# Patient Record
Sex: MALE | Race: WHITE | NOT HISPANIC OR LATINO
[De-identification: names, ages, dates, MRNs, and addresses within clinical notes are randomized per-mention and may not be internally consistent; named-entity substitution may affect disease eponyms.]

---

## 2022-09-12 PROBLEM — Z00.00 ENCOUNTER FOR PREVENTIVE HEALTH EXAMINATION: Status: ACTIVE | Noted: 2022-09-12

## 2022-09-29 ENCOUNTER — NON-APPOINTMENT (OUTPATIENT)
Age: 42
End: 2022-09-29

## 2022-09-29 ENCOUNTER — APPOINTMENT (OUTPATIENT)
Dept: UROLOGY | Facility: CLINIC | Age: 42
End: 2022-09-29

## 2022-09-29 VITALS
BODY MASS INDEX: 30.06 KG/M2 | HEIGHT: 70 IN | HEART RATE: 67 BPM | WEIGHT: 210 LBS | DIASTOLIC BLOOD PRESSURE: 79 MMHG | TEMPERATURE: 97.9 F | SYSTOLIC BLOOD PRESSURE: 121 MMHG

## 2022-09-29 DIAGNOSIS — E29.1 TESTICULAR HYPOFUNCTION: ICD-10-CM

## 2022-09-29 PROCEDURE — 93975 VASCULAR STUDY: CPT

## 2022-09-29 PROCEDURE — 99204 OFFICE O/P NEW MOD 45 MIN: CPT | Mod: 25

## 2022-09-29 NOTE — LETTER BODY
[Dear  ___] : Dear  [unfilled], [FreeTextEntry2] : Malcolm Santacruz MD, PhD\par ATTN: LAB/Dario Kim\par [facility]\par [address], 4th Floor\[place] [address] [FreeTextEntry1] : I had the pleasure of seeing TIMOTHY MIJARES, a 42 year old man,  to your patient Dotty Carranza, in the office in consultation today. Please see the attached note for full details.\par \par Thank you very much for allowing me to participate in the care of this patient. If you have any questions please feel free to call me at any time. \par \par \par Sincerely yours,\par \par \par \par Hollnad Perkins MD, SAMUEL\par Director, Male Fertility and Microsurgery\par  of Urology\par [facility]\par

## 2022-09-29 NOTE — PHYSICAL EXAM
[Urethral Meatus] : meatus normal [Penis Abnormality] : normal uncircumcised penis [Urinary Bladder Findings] : the bladder was normal on palpation [Scrotum] : the scrotum was normal [Epididymis] : the epididymides were normal [Testes Tenderness] : no tenderness of the testes [Testes Mass (___cm)] : there were no testicular masses [FreeTextEntry1] : grade II left varicocele. 10cc bilateral testes

## 2022-09-29 NOTE — HISTORY OF PRESENT ILLNESS
[FreeTextEntry1] : 42M  to Dotty Carranza (40F) no children together\par attempting x 12 months\par previous natural preganncy x 2 (2 years ago)\par oriiginally told to have moderate abnormality\par 6 cycles IVF\par 4 cycles with fert\par PGD abnl\par no embryo transfers\par most recent SA now with azoospermia\par wife with history endometriosis\par seen previously by Ledy\par was told to take clomid previously and did not take\par scroal ultrasound  no varicocele\par

## 2022-09-29 NOTE — ASSESSMENT
[FreeTextEntry1] : hypogoandism\par left varicocele\par proven sonographically today\par multiple failed IVFs\par last SA azopsemria\par TT FSH LH E2\par consider clomid\par consider TESE vs varicocelectomy\par f/u 1-2 weeks

## 2022-10-03 LAB
ESTRADIOL SERPL-MCNC: 22 PG/ML
FSH SERPL-MCNC: 23.5 IU/L
LH SERPL-ACNC: 7.3 IU/L
TESTOST FREE SERPL-MCNC: 8.2 PG/ML
TESTOST SERPL-MCNC: 312 NG/DL

## 2022-10-06 ENCOUNTER — APPOINTMENT (OUTPATIENT)
Dept: UROLOGY | Facility: CLINIC | Age: 42
End: 2022-10-06

## 2022-10-06 VITALS — DIASTOLIC BLOOD PRESSURE: 86 MMHG | TEMPERATURE: 98.3 F | SYSTOLIC BLOOD PRESSURE: 128 MMHG | HEART RATE: 74 BPM

## 2022-10-06 DIAGNOSIS — I86.1 SCROTAL VARICES: ICD-10-CM

## 2022-10-06 PROCEDURE — 99214 OFFICE O/P EST MOD 30 MIN: CPT | Mod: 57

## 2022-10-06 NOTE — ASSESSMENT
[FreeTextEntry1] : left varicocele\par azoospermia\par reviewed role office TESE vs microTESE vs varicocelecotmy\par We spoke at length about the role of office based TESE with possible percutaneous epididymal sperm aspiration. Risks- bleeding, pain, infection, hypogonadism all discussed.  Risk of secondary epididymal obstruction also discussed. Procedure will be done under local anesthesia. He understands that sperm yields tend to be lower with this and that he may require future procedure for repeat sperm extraction if IVF attempts fail.\par \par I had a long conversation with him and his wife regarding the role of surgical sperm retrieval. They understand that some patients harbor pockets of spermatogenesis within the testis that can be retrieved surgically and used with IVF. In general, there is an approximately 45-60% likelihood of successful retrieval. Retrieval options, including a complete microdissection of the testis and more limited testicular biopsies were also discussed. They understand that the microTESE may have the best likelihood of identifying spermatogenesis. Risks of surgery, including hematoma, pain, long term hypogonadism requiring testosterone replacement therapy, were also discussed.\par \par We had an in depth conversation regarding the benefit of varicocelectomy today. He understands the literature which overwhelming reports a benefit in improvement in semen parameters. He also understands that there is limited data in terms of spontaneous pregnancy and take home baby rates.There is good data suggesting improved semen parameters which can possibly obviate the need for future IVF and has been shown to improve IVF outcomes in selected patients. We discussed the recent results of a Rachael review which reported that one natural pregnancy is achieved for every 3-5 varicocele repairs over an unknown time frame. We also spoke about the fact that there is a 3 to 6 month delay before improvement is seen in semen parameters. The role of advancing maternal age was discussed in depth.\par \par Surgical risks, including a risk of infection, injury to the testicular artery (extremely rare), injury to the vas deferens and hydrocele were discussed, as was post operative pain and pain control. Post operative skin numbness in the anterior thigh/lateral scrotum were also discussed.\par \par opts for varicocelecotmy\par

## 2022-10-18 ENCOUNTER — TRANSCRIPTION ENCOUNTER (OUTPATIENT)
Age: 42
End: 2022-10-18

## 2022-10-18 NOTE — ASU PATIENT PROFILE, ADULT - NS PREOP UNDERSTANDS INFO
spoke to patient , NPO after 12MN,, no food no smoking no drinking, bring ID photo, insurance and vaccination cards, escort  arranged  , address and telephone given to patient/yes

## 2022-10-18 NOTE — ASU PATIENT PROFILE, ADULT - NS PREOP UNDERSTANDS INFO2
nothing to eat or drink after midnite.  Escort avaiilable to take home after.  Photo id and insurance card to first floor admitting office./yes

## 2022-10-18 NOTE — ASU PATIENT PROFILE, ADULT - NSICDXPASTSURGICALHX_GEN_ALL_CORE_FT
PAST SURGICAL HISTORY:  No significant past surgical history PAST SURGICAL HISTORY:  Status post left foot surgery dayron     PAST SURGICAL HISTORY:  Status post left foot surgery achilles

## 2022-10-18 NOTE — ASU PATIENT PROFILE, ADULT - FALL HARM RISK - UNIVERSAL INTERVENTIONS
Bed in lowest position, wheels locked, appropriate side rails in place/Call bell, personal items and telephone in reach/Instruct patient to call for assistance before getting out of bed or chair/Non-slip footwear when patient is out of bed/[place] to call system/Physically safe environment - no spills, clutter or unnecessary equipment/Purposeful Proactive Rounding/Room/bathroom lighting operational, light cord in reach

## 2022-10-19 ENCOUNTER — OUTPATIENT (OUTPATIENT)
Dept: OUTPATIENT SERVICES | Facility: HOSPITAL | Age: 42
LOS: 1 days | Discharge: HOME | End: 2022-10-19

## 2022-10-19 ENCOUNTER — APPOINTMENT (OUTPATIENT)
Dept: UROLOGY | Facility: AMBULATORY SURGERY CENTER | Age: 42
End: 2022-10-19

## 2022-10-19 ENCOUNTER — TRANSCRIPTION ENCOUNTER (OUTPATIENT)
Age: 42
End: 2022-10-19

## 2022-10-19 VITALS
HEART RATE: 72 BPM | DIASTOLIC BLOOD PRESSURE: 63 MMHG | OXYGEN SATURATION: 98 % | RESPIRATION RATE: 15 BRPM | SYSTOLIC BLOOD PRESSURE: 112 MMHG

## 2022-10-19 VITALS
TEMPERATURE: 98 F | HEIGHT: 69 IN | WEIGHT: 217.82 LBS | OXYGEN SATURATION: 97 % | DIASTOLIC BLOOD PRESSURE: 82 MMHG | HEART RATE: 64 BPM | SYSTOLIC BLOOD PRESSURE: 108 MMHG | RESPIRATION RATE: 16 BRPM

## 2022-10-19 DIAGNOSIS — Z98.890 OTHER SPECIFIED POSTPROCEDURAL STATES: Chronic | ICD-10-CM

## 2022-10-19 PROCEDURE — 55535 REVISE SPERMATIC CORD VEINS: CPT | Mod: LT

## 2022-10-19 DEVICE — CLIP APPLIER COVIDIEN SURGICLIP III 9" SM: Type: IMPLANTABLE DEVICE | Status: FUNCTIONAL

## 2022-10-19 DEVICE — CLIP APPLIER COVIDIEN SURGICLIP 13" LARGE: Type: IMPLANTABLE DEVICE | Status: FUNCTIONAL

## 2022-10-19 RX ORDER — KETOROLAC TROMETHAMINE 30 MG/ML
15 SYRINGE (ML) INJECTION ONCE
Refills: 0 | Status: DISCONTINUED | OUTPATIENT
Start: 2022-10-19 | End: 2022-10-19

## 2022-10-19 RX ORDER — FENTANYL CITRATE 50 UG/ML
25 INJECTION INTRAVENOUS
Refills: 0 | Status: DISCONTINUED | OUTPATIENT
Start: 2022-10-19 | End: 2022-10-19

## 2022-10-19 RX ORDER — ACETAMINOPHEN 500 MG
1000 TABLET ORAL ONCE
Refills: 0 | Status: COMPLETED | OUTPATIENT
Start: 2022-10-19 | End: 2022-10-19

## 2022-10-19 RX ORDER — SODIUM CHLORIDE 9 MG/ML
1000 INJECTION, SOLUTION INTRAVENOUS
Refills: 0 | Status: DISCONTINUED | OUTPATIENT
Start: 2022-10-19 | End: 2022-10-19

## 2022-10-19 RX ORDER — ONDANSETRON 8 MG/1
4 TABLET, FILM COATED ORAL ONCE
Refills: 0 | Status: DISCONTINUED | OUTPATIENT
Start: 2022-10-19 | End: 2022-10-19

## 2022-10-19 RX ORDER — DOCUSATE SODIUM 100 MG
1 CAPSULE ORAL
Qty: 42 | Refills: 0
Start: 2022-10-19 | End: 2022-11-01

## 2022-10-19 RX ADMIN — Medication 1000 MILLIGRAM(S): at 09:58

## 2022-10-19 NOTE — BRIEF OPERATIVE NOTE - OPERATION/FINDINGS
Inguinal incision on left side made. Using microscope, identified 2 testicular arteries. Preserved 2 lymphatics. Varicocele veins clipped. Closed with sutures and dermabond. Local given. Scrotal support

## 2022-10-19 NOTE — ASU DISCHARGE PLAN (ADULT/PEDIATRIC) - CARE PROVIDER_API CALL
Brisa Perkins  Urology  [address], [facility] [place] [address]  Phone: [phone]  Fax: [phone]  Follow Up Time: 2 weeks

## 2022-10-19 NOTE — BRIEF OPERATIVE NOTE - NSICDXBRIEFPROCEDURE_GEN_ALL_CORE_FT
PROCEDURES:  Unilateral microsurgical removal of varicocele 19-Oct-2022 12:33:56 left Alex Santacruz

## 2022-10-19 NOTE — ASU DISCHARGE PLAN (ADULT/PEDIATRIC) - NS MD DC FALL RISK RISK
For information on Fall & Injury Prevention, visit: [email or web address] OR  [email or web address] OR  https://www.cdc.gov/steadi/patient.html

## 2022-10-19 NOTE — ASU DISCHARGE PLAN (ADULT/PEDIATRIC) - ASU DC SPECIAL INSTRUCTIONSFT
VARICOCELECTOMY    SURGICAL WOUND: There are often lumps and bumps that can be felt in the scrotum on either or both sides up to two (2) months or more post operatively. These are of no concern and with time they will soften and disappear.  Any “black and blue” bruising areas are expected and will also resolve over weeks.  Normally, there is also swelling of the scrotum post operatively. Sometimes the tissue fluid which causes the swelling migrates to the penile skin and can look alarming; with time, all the swelling will eventually subside but may take weeks.  A scrotal support and scrotal fluffs should be worn at all times for the next few weeks, unless bathing, to minimize this swelling. You may apply an ice-pack for 15 minutes out of every hour for the first 24 -36 hours to minimize pain and swelling.    STITCHES: The stitches in the incision will dissolve and fall out by themselves. Sometimes skin stitches may open, allowing a slight gaping of the incision. This is no problem if you keep the area clean.  There may be a bluish colored waterproof glue over the incision as well – the glue will peel away and fall off on its own over a couple of weeks.     PAIN: You may have some intermittent pain for up to six (6) weeks post operatively. Pain does not signify any problem unless associated with fever, chills, or inability to void.  If you experience any fevers or chills please call immediately as this may be signs of an infection. You may take Tylenol (acetaminophen) 650-975mg and/or Motrin (ibuprofen) 400-600mg, both available over the counter, for pain every 6 hours as needed. Do not exceed 4000mg of Tylenol (acetaminophen) daily. You may alternate these medications such that you take one or the other every 3 hours for around the clock pain coverage. For severe pain, take Percocet 5/325mg every 8 hours. Perocet does have tylenol, do not exceed 4,000 mg a day of tylenol.     STOOL SOFTENERS: Do not allow yourself to become constipated as straining may cause bleeding. Take stool softeners or a laxative (ex. Miralax, Colace, Senokot, ExLax, etc), available over the counter, if taking Percocet.     ANTICOAGULATION: If you are taking any blood thinning medications, please discuss with your urologist prior to restarting these medications unless otherwise specified.    BATHING: You may sponge bath 24 hours after surgery, but minimize water to the surgical incision    DIET: You may resume your regular diet and regular medication regimen.    ACTIVITY: No heavy lifting or strenuous exercise until you are evaluated at your post-operative appointment. Otherwise, you may return to your usual level of physical activity.    FOLLOW-UP: If you did not already schedule your post-operative appointment, please call your urologist to schedule and follow-up appointment.    CALL YOUR UROLOGIST IF: You have any bleeding that does not stop, inability to void >8 hours, fever over 100.4 F, chills, persistent nausea/vomiting, changes in your incision concerning for infection, or if your pain is not controlled on your discharge pain medications.

## 2022-10-21 PROBLEM — Z78.9 OTHER SPECIFIED HEALTH STATUS: Chronic | Status: ACTIVE | Noted: 2022-10-18

## 2022-11-02 NOTE — HISTORY OF PRESENT ILLNESS
[FreeTextEntry1] : comes in with his wife\par failed previous IVF x mulitple cycles\par 3.5 mm L varicocele\par azoospermia\par \par FSH 23.5\par  History/Exam/Medical Decision Making

## 2022-11-03 ENCOUNTER — APPOINTMENT (OUTPATIENT)
Dept: UROLOGY | Facility: CLINIC | Age: 42
End: 2022-11-03

## 2022-11-03 VITALS
SYSTOLIC BLOOD PRESSURE: 124 MMHG | WEIGHT: 210 LBS | HEIGHT: 70 IN | DIASTOLIC BLOOD PRESSURE: 84 MMHG | BODY MASS INDEX: 30.06 KG/M2 | HEART RATE: 69 BPM | TEMPERATURE: 97.2 F

## 2022-11-03 PROCEDURE — 99024 POSTOP FOLLOW-UP VISIT: CPT

## 2022-11-03 NOTE — HISTORY OF PRESENT ILLNESS
[FreeTextEntry1] : 2 weeks s/p left varicoclee\par feels well\par pain resolved\par no ertyhema\par inciosn clearn\par did ET last week\par f/u 3 months

## 2022-11-11 ENCOUNTER — NON-APPOINTMENT (OUTPATIENT)
Age: 42
End: 2022-11-11

## 2023-01-19 ENCOUNTER — APPOINTMENT (OUTPATIENT)
Dept: UROLOGY | Facility: CLINIC | Age: 43
End: 2023-01-19

## 2023-03-30 ENCOUNTER — APPOINTMENT (OUTPATIENT)
Dept: UROLOGY | Facility: CLINIC | Age: 43
End: 2023-03-30
Payer: COMMERCIAL

## 2023-03-30 VITALS
BODY MASS INDEX: 30.06 KG/M2 | WEIGHT: 210 LBS | TEMPERATURE: 97.7 F | DIASTOLIC BLOOD PRESSURE: 80 MMHG | HEIGHT: 70 IN | HEART RATE: 79 BPM | SYSTOLIC BLOOD PRESSURE: 128 MMHG

## 2023-03-30 PROCEDURE — 99213 OFFICE O/P EST LOW 20 MIN: CPT

## 2023-03-30 NOTE — HISTORY OF PRESENT ILLNESS
[FreeTextEntry1] : 42M s/p varicocelectomy \par \par - feels well, without pain, mo sexual dysfunciton. \par - denies complaints. \par - Reports Wife is pregnant, due July

## 2023-03-30 NOTE — PHYSICAL EXAM
[Normal Appearance] : normal appearance [General Appearance - In No Acute Distress] : no acute distress [Abdomen Hernia] : no hernia was discovered [Costovertebral Angle Tenderness] : no ~M costovertebral angle tenderness [Urinary Bladder Findings] : the bladder was normal on palpation [Testes Tenderness] : no tenderness of the testes [Respiration, Rhythm And Depth] : normal respiratory rhythm and effort [Exaggerated Use Of Accessory Muscles For Inspiration] : no accessory muscle use [Oriented To Time, Place, And Person] : oriented to person, place, and time [Normal Station and Gait] : the gait and station were normal for the patient's age [FreeTextEntry1] : Incision site healed well.

## 2023-03-30 NOTE — ASSESSMENT
[FreeTextEntry1] : 42M s/p varicocelectomy 10/2022\par wife in due in June\par Site healed well.\par non erythematous\par without pain. \par - SA in 6 months

## 2025-07-30 NOTE — ASU PATIENT PROFILE, ADULT - NS PRO ABUSE SCREEN AFRAID ANYONE YN
[facility] Program Outreach:   Name: GUNNAR HORVATH     Question 1   Daily Activities   Have you been able to perform your daily activities such as getting dressed or making your meals, as you normally would? Please press 1 if you have been able to perform daily activities. Press 2 if you have not been able to.   Not performing Daily Activities       Spoke with  POA. Since discharge, patient is feeling better.     Patient is being outreached by the Care Transitions Program with concerns regarding Daily Activities.  Patient is not able to perform all ADL's as they normally would, however, they have someone that is able to help if necessary.    Denies any further questions, concerns or assistance at this time.    no

## (undated) DEVICE — VESSEL LOOP MINI-BLUE 0.075" X 16"

## (undated) DEVICE — SUT SILK 4-0 18" TIES

## (undated) DEVICE — ELCTR BIPOLAR CORD 12FT

## (undated) DEVICE — WARMING BLANKET UPPER ADULT

## (undated) DEVICE — DRSG GAUZE MOISTURIZER 0.5 OZ 4X8

## (undated) DEVICE — SYR LUER LOK 10CC

## (undated) DEVICE — DRSG SUPPORTER ADULT 3" WAISTBAND LRG

## (undated) DEVICE — DOPPLER PROBE 20MHZ DISP

## (undated) DEVICE — GLV 7.5 PROTEXIS (WHITE)

## (undated) DEVICE — DRAIN PENROSE 5/8" X 18" LATEX

## (undated) DEVICE — SUT VICRYL 3-0 27" SH UNDYED

## (undated) DEVICE — DRAPE MICROSCOPE ZEISS

## (undated) DEVICE — SYR LUER LOK 3CC

## (undated) DEVICE — SLV COMPRESSION KNEE MED

## (undated) DEVICE — PACK GENERAL MINOR

## (undated) DEVICE — CATH IV SAFE BC 24G X 0.75" (YELLOW)

## (undated) DEVICE — SUT MONOCRYL 4-0 27" PS-2 UNDYED